# Patient Record
Sex: FEMALE | Race: WHITE | NOT HISPANIC OR LATINO | Employment: OTHER | URBAN - METROPOLITAN AREA
[De-identification: names, ages, dates, MRNs, and addresses within clinical notes are randomized per-mention and may not be internally consistent; named-entity substitution may affect disease eponyms.]

---

## 2020-09-29 ENCOUNTER — TRANSCRIBE ORDERS (OUTPATIENT)
Dept: CARDIOLOGY | Facility: HOSPITAL | Age: 70
End: 2020-09-29

## 2020-09-29 ENCOUNTER — HOSPITAL ENCOUNTER (OUTPATIENT)
Dept: CARDIOLOGY | Facility: HOSPITAL | Age: 70
Discharge: HOME | End: 2020-09-29
Admitting: OTOLARYNGOLOGY

## 2020-09-29 DIAGNOSIS — J01.30 ACUTE SPHENOIDAL SINUSITIS, RECURRENCE NOT SPECIFIED: Primary | ICD-10-CM

## 2020-09-29 PROCEDURE — 93010 ELECTROCARDIOGRAM REPORT: CPT | Performed by: INTERNAL MEDICINE

## 2020-09-29 PROCEDURE — 93005 ELECTROCARDIOGRAM TRACING: CPT

## 2021-11-15 ENCOUNTER — TELEHEALTH PROVIDED OTHER THAN IN PATIENT'S HOME (OUTPATIENT)
Dept: URBAN - METROPOLITAN AREA TELEHEALTH 6 | Facility: TELEHEALTH | Age: 71
End: 2021-11-15

## 2021-11-15 DIAGNOSIS — R05.3 CHRONIC COUGH: ICD-10-CM

## 2021-11-15 DIAGNOSIS — K21.9 GASTRO-ESOPHAGEAL REFLUX DISEASE WITHOUT ESOPHAGITIS: ICD-10-CM

## 2021-11-15 DIAGNOSIS — Z83.71 FAMILY HISTORY OF COLONIC POLYPS: ICD-10-CM

## 2021-11-15 DIAGNOSIS — R68.89 OTHER GENERAL SYMPTOMS AND SIGNS: ICD-10-CM

## 2021-11-15 DIAGNOSIS — Z12.11 ENCOUNTER FOR SCREENING FOR MALIGNANT NEOPLASM OF COLON: ICD-10-CM

## 2021-11-15 PROCEDURE — 99213 OFFICE O/P EST LOW 20 MIN: CPT | Mod: 95 | Performed by: INTERNAL MEDICINE

## 2021-12-09 VITALS
HEART RATE: 88 BPM | RESPIRATION RATE: 16 BRPM | DIASTOLIC BLOOD PRESSURE: 60 MMHG | HEART RATE: 73 BPM | SYSTOLIC BLOOD PRESSURE: 156 MMHG | HEART RATE: 82 BPM | TEMPERATURE: 97 F | DIASTOLIC BLOOD PRESSURE: 71 MMHG | OXYGEN SATURATION: 93 % | HEART RATE: 76 BPM | RESPIRATION RATE: 10 BRPM | SYSTOLIC BLOOD PRESSURE: 181 MMHG | OXYGEN SATURATION: 99 % | OXYGEN SATURATION: 100 % | SYSTOLIC BLOOD PRESSURE: 137 MMHG | SYSTOLIC BLOOD PRESSURE: 121 MMHG | DIASTOLIC BLOOD PRESSURE: 69 MMHG | RESPIRATION RATE: 11 BRPM | SYSTOLIC BLOOD PRESSURE: 136 MMHG | HEART RATE: 77 BPM | SYSTOLIC BLOOD PRESSURE: 174 MMHG | OXYGEN SATURATION: 95 % | HEART RATE: 81 BPM | WEIGHT: 168 LBS | SYSTOLIC BLOOD PRESSURE: 124 MMHG | DIASTOLIC BLOOD PRESSURE: 64 MMHG | DIASTOLIC BLOOD PRESSURE: 46 MMHG | HEIGHT: 64 IN | HEART RATE: 69 BPM | HEART RATE: 68 BPM | OXYGEN SATURATION: 96 % | RESPIRATION RATE: 14 BRPM | DIASTOLIC BLOOD PRESSURE: 58 MMHG | SYSTOLIC BLOOD PRESSURE: 116 MMHG | SYSTOLIC BLOOD PRESSURE: 134 MMHG | DIASTOLIC BLOOD PRESSURE: 68 MMHG | HEART RATE: 86 BPM | DIASTOLIC BLOOD PRESSURE: 87 MMHG | DIASTOLIC BLOOD PRESSURE: 74 MMHG | RESPIRATION RATE: 13 BRPM | RESPIRATION RATE: 18 BRPM | RESPIRATION RATE: 21 BRPM | SYSTOLIC BLOOD PRESSURE: 114 MMHG | RESPIRATION RATE: 15 BRPM | RESPIRATION RATE: 19 BRPM | DIASTOLIC BLOOD PRESSURE: 62 MMHG | OXYGEN SATURATION: 97 % | DIASTOLIC BLOOD PRESSURE: 55 MMHG

## 2021-12-13 ENCOUNTER — AMBULATORY SURGICAL CENTER (OUTPATIENT)
Dept: URBAN - METROPOLITAN AREA SURGERY 17 | Facility: SURGERY | Age: 71
End: 2021-12-13

## 2021-12-13 ENCOUNTER — OFFICE (OUTPATIENT)
Dept: URBAN - METROPOLITAN AREA PATHOLOGY 4 | Facility: PATHOLOGY | Age: 71
End: 2021-12-13

## 2021-12-13 VITALS — HEIGHT: 64 IN

## 2021-12-13 DIAGNOSIS — D12.5 BENIGN NEOPLASM OF SIGMOID COLON: ICD-10-CM

## 2021-12-13 DIAGNOSIS — Z12.11 ENCOUNTER FOR SCREENING FOR MALIGNANT NEOPLASM OF COLON: ICD-10-CM

## 2021-12-13 DIAGNOSIS — R05.9 COUGH, UNSPECIFIED: ICD-10-CM

## 2021-12-13 DIAGNOSIS — K22.2 ESOPHAGEAL OBSTRUCTION: ICD-10-CM

## 2021-12-13 DIAGNOSIS — F45.8 OTHER SOMATOFORM DISORDERS: ICD-10-CM

## 2021-12-13 DIAGNOSIS — K21.9 GASTRO-ESOPHAGEAL REFLUX DISEASE WITHOUT ESOPHAGITIS: ICD-10-CM

## 2021-12-13 PROBLEM — K63.5 POLYP OF COLON: Status: ACTIVE | Noted: 2021-12-13

## 2021-12-13 LAB
GI HISTOLOGY: A. UNSPECIFIED: (no result)
GI HISTOLOGY: B. UNSPECIFIED: (no result)
GI HISTOLOGY: PDF REPORT: (no result)

## 2021-12-13 PROCEDURE — 43450 DILATE ESOPHAGUS 1/MULT PASS: CPT | Performed by: INTERNAL MEDICINE

## 2021-12-13 PROCEDURE — 45385 COLONOSCOPY W/LESION REMOVAL: CPT | Performed by: INTERNAL MEDICINE

## 2021-12-13 PROCEDURE — 43235 EGD DIAGNOSTIC BRUSH WASH: CPT | Performed by: INTERNAL MEDICINE

## 2021-12-13 PROCEDURE — 88305 TISSUE EXAM BY PATHOLOGIST: CPT | Performed by: INTERNAL MEDICINE

## 2021-12-13 NOTE — SERVICEHPINOTES
11/15/2021   seen by telehealty  unstable video connection so we ended up finishing the visit by video only   some indigestion and reflux  mostly cough and throat sx   due to 10 yr C-scope    bypass existing patient for 10 year screening.  new patient forms reviewed and verified.
alvin esquivel   12/13/2021   comes to [facility] for EGD &amp CN today

## 2021-12-13 NOTE — SERVICENOTES
Additional Nurses Information:
Spoke with , he will have her call if she has any questions.


Reason for Procedure:
Screening
Family history of colon polyps